# Patient Record
Sex: FEMALE | Race: WHITE | NOT HISPANIC OR LATINO | URBAN - METROPOLITAN AREA
[De-identification: names, ages, dates, MRNs, and addresses within clinical notes are randomized per-mention and may not be internally consistent; named-entity substitution may affect disease eponyms.]

---

## 2017-11-21 ENCOUNTER — OUTPATIENT (OUTPATIENT)
Dept: OUTPATIENT SERVICES | Facility: HOSPITAL | Age: 62
LOS: 1 days | Discharge: HOME | End: 2017-11-21

## 2017-11-21 DIAGNOSIS — C50.911 MALIGNANT NEOPLASM OF UNSPECIFIED SITE OF RIGHT FEMALE BREAST: ICD-10-CM

## 2017-11-29 ENCOUNTER — RESULT REVIEW (OUTPATIENT)
Age: 62
End: 2017-11-29

## 2017-11-29 ENCOUNTER — APPOINTMENT (OUTPATIENT)
Dept: HEMATOLOGY ONCOLOGY | Facility: CLINIC | Age: 62
End: 2017-11-29
Payer: COMMERCIAL

## 2017-11-29 VITALS
RESPIRATION RATE: 16 BRPM | WEIGHT: 121.25 LBS | DIASTOLIC BLOOD PRESSURE: 70 MMHG | BODY MASS INDEX: 20.45 KG/M2 | TEMPERATURE: 98.5 F | SYSTOLIC BLOOD PRESSURE: 113 MMHG | OXYGEN SATURATION: 100 % | HEART RATE: 74 BPM

## 2017-11-29 DIAGNOSIS — E78.5 HYPERLIPIDEMIA, UNSPECIFIED: ICD-10-CM

## 2017-11-29 LAB
HCT VFR BLD CALC: 37.1 % — SIGNIFICANT CHANGE UP (ref 34.5–45)
HGB BLD-MCNC: 12.9 G/DL — SIGNIFICANT CHANGE UP (ref 11.5–15.5)
MCHC RBC-ENTMCNC: 31.1 PG — SIGNIFICANT CHANGE UP (ref 27–34)
MCHC RBC-ENTMCNC: 34.9 G/DL — SIGNIFICANT CHANGE UP (ref 32–36)
MCV RBC AUTO: 89.1 FL — SIGNIFICANT CHANGE UP (ref 80–100)
PLATELET # BLD AUTO: 199 K/UL — SIGNIFICANT CHANGE UP (ref 150–400)
RBC # BLD: 4.16 M/UL — SIGNIFICANT CHANGE UP (ref 3.8–5.2)
RBC # FLD: 11.5 % — SIGNIFICANT CHANGE UP (ref 10.3–14.5)
WBC # BLD: 6.2 K/UL — SIGNIFICANT CHANGE UP (ref 3.8–10.5)
WBC # FLD AUTO: 6.2 K/UL — SIGNIFICANT CHANGE UP (ref 3.8–10.5)

## 2017-11-29 PROCEDURE — 99214 OFFICE O/P EST MOD 30 MIN: CPT

## 2017-11-30 LAB
25(OH)D3 SERPL-MCNC: 33.7 NG/ML
ALBUMIN SERPL ELPH-MCNC: 4.8 G/DL
ALP BLD-CCNC: 71 U/L
ALT SERPL-CCNC: 19 U/L
ANION GAP SERPL CALC-SCNC: 15 MMOL/L
AST SERPL-CCNC: 27 U/L
BILIRUB SERPL-MCNC: 0.5 MG/DL
BUN SERPL-MCNC: 13 MG/DL
CALCIUM SERPL-MCNC: 9.8 MG/DL
CHLORIDE SERPL-SCNC: 99 MMOL/L
CHOLEST SERPL-MCNC: 192 MG/DL
CHOLEST/HDLC SERPL: 2.2 RATIO
CO2 SERPL-SCNC: 27 MMOL/L
CREAT SERPL-MCNC: 0.61 MG/DL
GLUCOSE SERPL-MCNC: 86 MG/DL
HDLC SERPL-MCNC: 89 MG/DL
LDLC SERPL CALC-MCNC: 91 MG/DL
POTASSIUM SERPL-SCNC: 4.6 MMOL/L
PROT SERPL-MCNC: 7.2 G/DL
SODIUM SERPL-SCNC: 141 MMOL/L
TRIGL SERPL-MCNC: 61 MG/DL

## 2019-03-20 ENCOUNTER — OUTPATIENT (OUTPATIENT)
Dept: OUTPATIENT SERVICES | Facility: HOSPITAL | Age: 64
LOS: 1 days | Discharge: HOME | End: 2019-03-20

## 2019-03-20 DIAGNOSIS — C50.911 MALIGNANT NEOPLASM OF UNSPECIFIED SITE OF RIGHT FEMALE BREAST: ICD-10-CM

## 2019-03-28 ENCOUNTER — RESULT REVIEW (OUTPATIENT)
Age: 64
End: 2019-03-28

## 2019-03-28 ENCOUNTER — APPOINTMENT (OUTPATIENT)
Dept: HEMATOLOGY ONCOLOGY | Facility: CLINIC | Age: 64
End: 2019-03-28
Payer: COMMERCIAL

## 2019-03-28 VITALS
HEART RATE: 74 BPM | RESPIRATION RATE: 16 BRPM | TEMPERATURE: 98.3 F | SYSTOLIC BLOOD PRESSURE: 115 MMHG | BODY MASS INDEX: 20.97 KG/M2 | DIASTOLIC BLOOD PRESSURE: 72 MMHG | WEIGHT: 124.34 LBS | OXYGEN SATURATION: 98 %

## 2019-03-28 DIAGNOSIS — R53.83 OTHER FATIGUE: ICD-10-CM

## 2019-03-28 LAB
HCT VFR BLD CALC: 37.1 % — SIGNIFICANT CHANGE UP (ref 34.5–45)
HGB BLD-MCNC: 13 G/DL — SIGNIFICANT CHANGE UP (ref 11.5–15.5)
MCHC RBC-ENTMCNC: 31.3 PG — SIGNIFICANT CHANGE UP (ref 27–34)
MCHC RBC-ENTMCNC: 35.1 G/DL — SIGNIFICANT CHANGE UP (ref 32–36)
MCV RBC AUTO: 89.3 FL — SIGNIFICANT CHANGE UP (ref 80–100)
PLATELET # BLD AUTO: 220 K/UL — SIGNIFICANT CHANGE UP (ref 150–400)
RBC # BLD: 4.16 M/UL — SIGNIFICANT CHANGE UP (ref 3.8–5.2)
RBC # FLD: 11.4 % — SIGNIFICANT CHANGE UP (ref 10.3–14.5)
WBC # BLD: 5.4 K/UL — SIGNIFICANT CHANGE UP (ref 3.8–10.5)
WBC # FLD AUTO: 5.4 K/UL — SIGNIFICANT CHANGE UP (ref 3.8–10.5)

## 2019-03-28 PROCEDURE — 99214 OFFICE O/P EST MOD 30 MIN: CPT

## 2019-03-28 NOTE — PHYSICAL EXAM
[Fully active, able to carry on all pre-disease performance without restriction] : Status 0 - Fully active, able to carry on all pre-disease performance without restriction [Normal] : affect appropriate [de-identified] : Right mastectomy with saline implant reconstruction, no chest wall mass.Left breast:scar UIQ, no mass.

## 2019-03-28 NOTE — HISTORY OF PRESENT ILLNESS
[Disease: _____________________] : Disease: [unfilled] [T: ___] : T[unfilled] [N: ___] : N[unfilled] [M: ___] : M[unfilled] [AJCC Stage: ____] : AJCC Stage: [unfilled] [Treatment Protocol] : Treatment Protocol [de-identified] : The patient presented in 2002, at age 47, with a mass she palpated in her right breast.\par \par \par \par Mammogram and breast ultrasound performed at that time were negative.\par \par \par \par The patient was seen in surgical consultation by Dr. Joann Magdaleno who performed a needle aspiration and atypia was found. Core biopsy performed by Dr. Durate Garrett on June 24, 2002 demonstrated an at least 0.9 cm infiltrating ductal/lobular carcinoma with a Luz Elena score of 6/9. The cancer was ER positive (100%), [place] negative and HER-2/mary jane negative. There was a minor component of DCIS.\par \par \par \par The patient transferred her surgical candidate Dr. Blake Rhoades who palpated an enlarged axillary lymph node. Breast MRI demonstrated a 2.9 cm breast mass.\par \par \par \par Dr. Rhoades performed a right mastectomy and axillary lymph node dissection on July 16, 2002. Pathology demonstrated a 3 cm infiltrating ductal carcinoma with lobular features with a Luz Elena score of 6/9. 40% of the tumor was DCIS. There was a second focus of invasive carcinoma measuring 0.6 cm in a different quadrant of the breast. 1/13 axillary lymph nodes was positive for metastatic disease.\par \par \par \par Postoperatively the patient received adjuvant AC x 4 extending from August 7, 2002 through October 16, 2002. Her cumulative dose of Adriamycin was 384 mg (240 mg/meter squared).\par \par \par \par The patient took tamoxifen from November 2002 through December 2005 and exemestane from December 2005 through December 2010.\par \par \par \par Comprehensive BRACAnalysis on August 13, 2008 was negative. 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 [de-identified] : The patient has been 16 years 8 months since breast cancer diagnosis. \par \par The patient completed chemotherapy AC  14  years 5 months ago. \par \par The patient completed 5 years of Aromasin 7 years  ago. \par \par Most recent  BREAST MRI  9/11/2015, BI RADS 1.\par \par Most recent  left breast  mammogram/breast ultrasound 10/17/2016,  BI RADS 2. "I have been very bad".\par \par The patient still feels  shape of reconstructed breast is changing.\par \par No longer sees breast surgeon nor plastic surgeon, both have retired.\par \par Most recent  yearly physical  with PCP  summer of 2016.\par \par Overall the patient state she feels well since last seen. \par \par No other interval event since last seen.

## 2019-03-28 NOTE — REVIEW OF SYSTEMS
[Negative] : Allergic/Immunologic [FreeTextEntry2] :  Energy level is WNL. Declined flu vaccination. [FreeTextEntry3] : Last eye exam  was > 3 years ago [FreeTextEntry7] : Last colonoscopy 11/24/2004, benign finding. Appetite is good. [FreeTextEntry8] : Last GYN exam  03/2016. Denies  vaginal spotting/bleeding. [FreeTextEntry9] : Last BMD 02/2013. No bone nor joints pain. [de-identified] : Last dermatology evaluation was 12/2018 for eczema on eyelid which resolved..

## 2019-03-29 LAB
ALBUMIN SERPL ELPH-MCNC: 4.9 G/DL
ALP BLD-CCNC: 67 U/L
ALT SERPL-CCNC: 20 U/L
ANION GAP SERPL CALC-SCNC: 11 MMOL/L
AST SERPL-CCNC: 26 U/L
BILIRUB SERPL-MCNC: 0.5 MG/DL
BUN SERPL-MCNC: 12 MG/DL
CALCIUM SERPL-MCNC: 10 MG/DL
CHLORIDE SERPL-SCNC: 102 MMOL/L
CHOLEST SERPL-MCNC: 189 MG/DL
CHOLEST/HDLC SERPL: 2 RATIO
CO2 SERPL-SCNC: 28 MMOL/L
CREAT SERPL-MCNC: 0.64 MG/DL
GLUCOSE SERPL-MCNC: 81 MG/DL
HDLC SERPL-MCNC: 93 MG/DL
LDLC SERPL CALC-MCNC: 86 MG/DL
POTASSIUM SERPL-SCNC: 4.1 MMOL/L
PROT SERPL-MCNC: 6.8 G/DL
SODIUM SERPL-SCNC: 141 MMOL/L
TRIGL SERPL-MCNC: 48 MG/DL
TSH SERPL-ACNC: 1.78 UIU/ML

## 2019-04-01 LAB — 25(OH)D3 SERPL-MCNC: 19.7 NG/ML

## 2020-05-11 ENCOUNTER — OUTPATIENT (OUTPATIENT)
Dept: OUTPATIENT SERVICES | Facility: HOSPITAL | Age: 65
LOS: 1 days | Discharge: HOME | End: 2020-05-11

## 2020-05-11 DIAGNOSIS — C50.911 MALIGNANT NEOPLASM OF UNSPECIFIED SITE OF RIGHT FEMALE BREAST: ICD-10-CM

## 2020-05-14 ENCOUNTER — APPOINTMENT (OUTPATIENT)
Dept: HEMATOLOGY ONCOLOGY | Facility: CLINIC | Age: 65
End: 2020-05-14
Payer: COMMERCIAL

## 2020-05-14 DIAGNOSIS — Z98.82 BREAST IMPLANT STATUS: ICD-10-CM

## 2020-05-14 DIAGNOSIS — M85.80 OTHER SPECIFIED DISORDERS OF BONE DENSITY AND STRUCTURE, UNSPECIFIED SITE: ICD-10-CM

## 2020-05-14 DIAGNOSIS — R73.09 OTHER ABNORMAL GLUCOSE: ICD-10-CM

## 2020-05-14 DIAGNOSIS — Z71.83 ENCOUNTER FOR NONPROCREATIVE GENETIC COUNSELING: ICD-10-CM

## 2020-05-14 DIAGNOSIS — C50.911 MALIGNANT NEOPLASM OF UNSPECIFIED SITE OF RIGHT FEMALE BREAST: ICD-10-CM

## 2020-05-14 DIAGNOSIS — Z80.0 FAMILY HISTORY OF MALIGNANT NEOPLASM OF DIGESTIVE ORGANS: ICD-10-CM

## 2020-05-14 DIAGNOSIS — Z00.00 ENCOUNTER FOR GENERAL ADULT MEDICAL EXAMINATION W/OUT ABNORMAL FINDINGS: ICD-10-CM

## 2020-05-14 PROCEDURE — 99214 OFFICE O/P EST MOD 30 MIN: CPT | Mod: 95

## 2020-05-14 NOTE — HISTORY OF PRESENT ILLNESS
[Medical Office: ([facility])___] : at the medical office located in  [Disease: _____________________] : Disease: [unfilled] [T: ___] : T[unfilled] [M: ___] : M[unfilled] [N: ___] : N[unfilled] [AJCC Stage: ____] : AJCC Stage: [unfilled] [Treatment Protocol] : Treatment Protocol [Home] : at home, [unfilled] , at the time of the visit. [Self] : self [Patient] : the patient [FreeTextEntry2] : Ariadne Vallecillo [de-identified] : The patient presented in 2002, at age 47, with a mass she palpated in her right breast.\par \par \par \par Mammogram and breast ultrasound performed at that time were negative.\par \par \par \par The patient was seen in surgical consultation by Dr. Joann Magdaleno who performed a needle aspiration and atypia was found. Core biopsy performed by Dr. Duarte Garrett on June 24, 2002 demonstrated an at least 0.9 cm infiltrating ductal/lobular carcinoma with a Luz Elena score of 6/9. The cancer was ER positive (100%), [place] negative and HER-2/mary jane negative. There was a minor component of DCIS.\par \par \par \par The patient transferred her surgical candidate Dr. Blake Rhoades who palpated an enlarged axillary lymph node. Breast MRI demonstrated a 2.9 cm breast mass.\par \par \par \par Dr. Rhoades performed a right mastectomy and axillary lymph node dissection on July 16, 2002. Pathology demonstrated a 3 cm infiltrating ductal carcinoma with lobular features with a Luz Elena score of 6/9. 40% of the tumor was DCIS. There was a second focus of invasive carcinoma measuring 0.6 cm in a different quadrant of the breast. 1/13 axillary lymph nodes was positive for metastatic disease.\par \par \par \par Postoperatively the patient received adjuvant AC x 4 extending from August 7, 2002 through October 16, 2002. Her cumulative dose of Adriamycin was 384 mg (240 mg/meter squared).\par \par \par \par The patient took tamoxifen from November 2002 through December 2005 and exemestane from December 2005 through December 2010.\par \par \par \par Comprehensive BRACAnalysis on August 13, 2008 was negative. 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 [de-identified] : Because of Covid 19 pandemic we agreed to doing a virtual visit  today.\par \par Verbal consent given on 5/14/2020 at 11:31 AM by Ariadne Vallecillo, patient\par \par The patient has been 16 years 8 months since breast cancer diagnosis. \par \par The patient completed chemotherapy AC  15  years 7 months ago. \par \par The patient completed 5 years of Aromasin 8 years 2 months ago. \par \par Most recent  BREAST MRI  9/11/2015, BI RADS 1.\par \par Most recent  left breast  mammogram/breast ultrasound 10/17/2016,  BI RADS 2. "I really was going to do it".\par \par The patient did not try to find out if her saline implant was recalled. She feels it looks unchanged.\par \par No longer sees breast surgeon nor plastic surgeon, both have retired.\par \par Most recent  yearly physical  with PCP  summer of 2016.\par \par No other interval event since last seen.

## 2020-05-14 NOTE — REVIEW OF SYSTEMS
[Negative] : Allergic/Immunologic [FreeTextEntry2] :  Energy level is WNL. Declined flu vaccination this past season. Thinks she may have had flu 1/2020. [FreeTextEntry7] : Last colonoscopy 11/24/2004, no polyps. [FreeTextEntry3] : Last eye exam summer 2019, had secondary cataract treated with laser. [FreeTextEntry8] : Last GYN exam  03/2016. Denies  vaginal spotting/bleeding. [FreeTextEntry9] : Last BMD 02/2013. No musculoskeletal pain. [de-identified] : Last dermatology evaluation was 12/2018.

## 2020-05-14 NOTE — PHYSICAL EXAM
[Fully active, able to carry on all pre-disease performance without restriction] : Status 0 - Fully active, able to carry on all pre-disease performance without restriction [Normal] : affect appropriate [de-identified] : Weight 136 lbs.